# Patient Record
Sex: MALE | Race: WHITE
[De-identification: names, ages, dates, MRNs, and addresses within clinical notes are randomized per-mention and may not be internally consistent; named-entity substitution may affect disease eponyms.]

---

## 2018-09-18 ENCOUNTER — HOSPITAL ENCOUNTER (OUTPATIENT)
Dept: HOSPITAL 9 - MADLAB | Age: 73
Discharge: HOME | End: 2018-09-18
Payer: MEDICARE

## 2018-09-18 DIAGNOSIS — I48.0: Primary | ICD-10-CM

## 2018-09-18 LAB
BASOPHILS # BLD AUTO: 0.1 THOU/UL (ref 0–0.2)
BASOPHILS NFR BLD AUTO: 1 % (ref 0–1)
EOSINOPHIL # BLD AUTO: 0.4 THOU/UL (ref 0–0.7)
EOSINOPHIL NFR BLD AUTO: 4 % (ref 0–10)
HGB BLD-MCNC: 13.8 G/DL (ref 14–18)
LYMPHOCYTES # BLD AUTO: 2.5 THOU/UL (ref 1.2–3.4)
LYMPHOCYTES NFR BLD AUTO: 27.5 % (ref 21–51)
MCH RBC QN AUTO: 33.6 PG (ref 27–31)
MCV RBC AUTO: 98 FL (ref 78–98)
MONOCYTES # BLD AUTO: 0.8 THOU/UL (ref 0.11–0.59)
MONOCYTES NFR BLD AUTO: 8.9 % (ref 0–10)
NEUTROPHILS # BLD AUTO: 5.3 THOU/UL (ref 1.4–6.5)
NEUTROPHILS NFR BLD AUTO: 58.6 % (ref 42–75)
PLATELET # BLD AUTO: 148 THOU/UL (ref 130–400)
RBC # BLD AUTO: 4.1 MILL/UL (ref 4.7–6.1)
WBC # BLD AUTO: 9 THOU/UL (ref 4.8–10.8)

## 2018-09-18 PROCEDURE — 85025 COMPLETE CBC W/AUTO DIFF WBC: CPT

## 2018-09-18 PROCEDURE — 36415 COLL VENOUS BLD VENIPUNCTURE: CPT

## 2020-12-08 ENCOUNTER — HOSPITAL ENCOUNTER (OUTPATIENT)
Dept: HOSPITAL 92 - LABBT | Age: 75
Discharge: HOME | End: 2020-12-08
Attending: ORTHOPAEDIC SURGERY
Payer: MEDICARE

## 2020-12-08 DIAGNOSIS — Z20.828: ICD-10-CM

## 2020-12-08 DIAGNOSIS — S83.241A: ICD-10-CM

## 2020-12-08 DIAGNOSIS — Z01.818: Primary | ICD-10-CM

## 2020-12-08 LAB
ANION GAP SERPL CALC-SCNC: 15 MMOL/L (ref 10–20)
BASOPHILS # BLD AUTO: 0.1 10X3/UL (ref 0–0.2)
BASOPHILS NFR BLD AUTO: 0.6 % (ref 0–2)
BUN SERPL-MCNC: 14 MG/DL (ref 8.4–25.7)
CALCIUM SERPL-MCNC: 8.7 MG/DL (ref 7.8–10.44)
CHLORIDE SERPL-SCNC: 106 MMOL/L (ref 98–107)
CO2 SERPL-SCNC: 27 MMOL/L (ref 23–31)
CREAT CL PREDICTED SERPL C-G-VRATE: 0 ML/MIN (ref 70–130)
EOSINOPHIL # BLD AUTO: 0.5 10X3/UL (ref 0–0.5)
EOSINOPHIL NFR BLD AUTO: 6 % (ref 0–6)
GLUCOSE SERPL-MCNC: 113 MG/DL (ref 83–110)
HGB BLD-MCNC: 11.4 G/DL (ref 14–18)
LYMPHOCYTES NFR BLD AUTO: 25.2 % (ref 18–47)
MCH RBC QN AUTO: 29 PG (ref 27–33)
MCV RBC AUTO: 94.4 FL (ref 80–100)
MONOCYTES # BLD AUTO: 0.7 10X3/UL (ref 0–1.1)
MONOCYTES NFR BLD AUTO: 8.8 % (ref 0–10)
NEUTROPHILS # BLD AUTO: 4.8 10X3/UL (ref 1.5–8.4)
NEUTROPHILS NFR BLD AUTO: 58.9 % (ref 40–75)
PLATELET # BLD AUTO: 158 10X3/UL (ref 130–400)
POTASSIUM SERPL-SCNC: 4.7 MMOL/L (ref 3.5–5.1)
RBC # BLD AUTO: 3.93 10X6/UL (ref 4.4–5.8)
SODIUM SERPL-SCNC: 143 MMOL/L (ref 136–145)
WBC # BLD AUTO: 8.1 10X3/UL (ref 4.5–11)

## 2020-12-08 PROCEDURE — U0003 INFECTIOUS AGENT DETECTION BY NUCLEIC ACID (DNA OR RNA); SEVERE ACUTE RESPIRATORY SYNDROME CORONAVIRUS 2 (SARS-COV-2) (CORONAVIRUS DISEASE [COVID-19]), AMPLIFIED PROBE TECHNIQUE, MAKING USE OF HIGH THROUGHPUT TECHNOLOGIES AS DESCRIBED BY CMS-2020-01-R: HCPCS

## 2020-12-08 PROCEDURE — 85025 COMPLETE CBC W/AUTO DIFF WBC: CPT

## 2020-12-08 PROCEDURE — 87635 SARS-COV-2 COVID-19 AMP PRB: CPT

## 2020-12-08 PROCEDURE — 93005 ELECTROCARDIOGRAM TRACING: CPT

## 2020-12-08 PROCEDURE — 93010 ELECTROCARDIOGRAM REPORT: CPT

## 2020-12-08 PROCEDURE — 80048 BASIC METABOLIC PNL TOTAL CA: CPT

## 2020-12-11 ENCOUNTER — HOSPITAL ENCOUNTER (OUTPATIENT)
Dept: HOSPITAL 92 - SDC | Age: 75
Discharge: HOME | End: 2020-12-11
Attending: ORTHOPAEDIC SURGERY
Payer: MEDICARE

## 2020-12-11 VITALS — BODY MASS INDEX: 32.1 KG/M2

## 2020-12-11 DIAGNOSIS — S83.242A: Primary | ICD-10-CM

## 2020-12-11 DIAGNOSIS — I10: ICD-10-CM

## 2020-12-11 DIAGNOSIS — E78.5: ICD-10-CM

## 2020-12-11 DIAGNOSIS — I25.10: ICD-10-CM

## 2020-12-11 DIAGNOSIS — Z87.891: ICD-10-CM

## 2020-12-11 DIAGNOSIS — Z79.899: ICD-10-CM

## 2020-12-11 DIAGNOSIS — Z79.82: ICD-10-CM

## 2020-12-11 DIAGNOSIS — Z95.5: ICD-10-CM

## 2020-12-11 DIAGNOSIS — Z95.1: ICD-10-CM

## 2020-12-11 DIAGNOSIS — M94.262: ICD-10-CM

## 2020-12-11 PROCEDURE — 0SBD4ZZ EXCISION OF LEFT KNEE JOINT, PERCUTANEOUS ENDOSCOPIC APPROACH: ICD-10-PCS | Performed by: ORTHOPAEDIC SURGERY

## 2020-12-11 PROCEDURE — S0020 INJECTION, BUPIVICAINE HYDRO: HCPCS

## 2020-12-11 NOTE — OP
DATE OF PROCEDURE:  12/11/2020



PROCEDURE PERFORMED:  Left knee arthroscopic partial medial meniscectomy.



PREOPERATIVE DIAGNOSIS:  Medial meniscus tear, left knee.



POSTOPERATIVE DIAGNOSES:  Medial meniscus tear of left knee with grade 3

chondromalacia of medial femoral condyle, grade 4 chondromalacia of small area

medial tibial plateau, intact lateral compartment.  No loose bodies.  Mild

patellofemoral arthritis. 



ANESTHESIA:  General.



BLOOD LOSS:  Minimal.



SPECIMEN:  None.



DRAINS:  None.



COMPLICATION:  None.



DESCRIPTION OF PROCEDURE:  Informed consent was obtained.  The patient was taken to

the operating room where general anesthesia was induced. Left leg was prepped and

draped in the usual sterile fashion. Scope was placed in the lateral portal. Probe

was placed in the medial portal.  Findings were as above.  I debrided the medial

meniscus with basket forceps, smoothed with a 4.0 full-radius resector. I did this

alternating until most of the posterior medial meniscus was removed.  I did perform

a small abrasion chondroplasty on the medial femoral and the tibial plateau

surfaces.  I swept the gutters for loose bodies, there were no loose bodies.  The

knee was irrigated and drained.  Sterile dressings applied. 







Job ID:  [id number]

## 2021-03-26 ENCOUNTER — HOSPITAL ENCOUNTER (INPATIENT)
Dept: HOSPITAL 92 - ERS | Age: 76
LOS: 3 days | Discharge: HOME | DRG: 811 | End: 2021-03-29
Attending: INTERNAL MEDICINE | Admitting: HOSPITALIST
Payer: MEDICARE

## 2021-03-26 VITALS — BODY MASS INDEX: 31.7 KG/M2

## 2021-03-26 DIAGNOSIS — Z79.01: ICD-10-CM

## 2021-03-26 DIAGNOSIS — I25.10: ICD-10-CM

## 2021-03-26 DIAGNOSIS — Z95.810: ICD-10-CM

## 2021-03-26 DIAGNOSIS — K64.8: ICD-10-CM

## 2021-03-26 DIAGNOSIS — I25.5: ICD-10-CM

## 2021-03-26 DIAGNOSIS — K25.4: ICD-10-CM

## 2021-03-26 DIAGNOSIS — K29.80: ICD-10-CM

## 2021-03-26 DIAGNOSIS — Z95.1: ICD-10-CM

## 2021-03-26 DIAGNOSIS — I11.0: ICD-10-CM

## 2021-03-26 DIAGNOSIS — D50.0: Primary | ICD-10-CM

## 2021-03-26 DIAGNOSIS — K63.5: ICD-10-CM

## 2021-03-26 DIAGNOSIS — I50.9: ICD-10-CM

## 2021-03-26 DIAGNOSIS — Z20.822: ICD-10-CM

## 2021-03-26 DIAGNOSIS — I48.0: ICD-10-CM

## 2021-03-26 DIAGNOSIS — Z87.891: ICD-10-CM

## 2021-03-26 DIAGNOSIS — K57.30: ICD-10-CM

## 2021-03-26 DIAGNOSIS — K29.71: ICD-10-CM

## 2021-03-26 DIAGNOSIS — E78.5: ICD-10-CM

## 2021-03-26 LAB
ALBUMIN SERPL BCG-MCNC: 3.7 G/DL (ref 3.4–4.8)
ALP SERPL-CCNC: 97 U/L (ref 40–110)
ALT SERPL W P-5'-P-CCNC: 8 U/L (ref 8–55)
ANION GAP SERPL CALC-SCNC: 12 MMOL/L (ref 10–20)
APTT PPP: 38.7 SEC (ref 22.9–36.1)
AST SERPL-CCNC: 19 U/L (ref 5–34)
BASOPHILS # BLD AUTO: 0 THOU/UL (ref 0–0.2)
BASOPHILS NFR BLD AUTO: 0.1 % (ref 0–1)
BILIRUB SERPL-MCNC: 0.7 MG/DL (ref 0.2–1.2)
BUN SERPL-MCNC: 16 MG/DL (ref 8.4–25.7)
CALCIUM SERPL-MCNC: 8.2 MG/DL (ref 7.8–10.44)
CHLORIDE SERPL-SCNC: 110 MMOL/L (ref 98–107)
CO2 SERPL-SCNC: 22 MMOL/L (ref 23–31)
CREAT CL PREDICTED SERPL C-G-VRATE: 0 ML/MIN (ref 70–130)
EOSINOPHIL # BLD AUTO: 0.2 THOU/UL (ref 0–0.7)
EOSINOPHIL NFR BLD AUTO: 3.3 % (ref 0–10)
GLOBULIN SER CALC-MCNC: 2.6 G/DL (ref 2.4–3.5)
GLUCOSE SERPL-MCNC: 117 MG/DL (ref 83–110)
HGB BLD-MCNC: 6 G/DL (ref 14–18)
HGB BLD-MCNC: 6.1 G/DL (ref 14–18)
HGB BLD-MCNC: 6.8 G/DL (ref 14–18)
INR PPP: 2.1
IRON SERPL-MCNC: 28 UG/DL (ref 65–175)
LYMPHOCYTES # BLD: 1.5 THOU/UL (ref 1.2–3.4)
LYMPHOCYTES NFR BLD AUTO: 21.2 % (ref 21–51)
MCH RBC QN AUTO: 25.3 PG (ref 27–31)
MCV RBC AUTO: 84 FL (ref 78–98)
MONOCYTES # BLD AUTO: 0.9 THOU/UL (ref 0.11–0.59)
MONOCYTES NFR BLD AUTO: 12.5 % (ref 0–10)
NEUTROPHILS # BLD AUTO: 4.4 THOU/UL (ref 1.4–6.5)
NEUTROPHILS NFR BLD AUTO: 62.9 % (ref 42–75)
PLATELET # BLD AUTO: 156 THOU/UL (ref 130–400)
POTASSIUM SERPL-SCNC: 4.4 MMOL/L (ref 3.5–5.1)
PROTHROMBIN TIME: 24 SEC (ref 12–14.7)
RBC # BLD AUTO: 2.39 MILL/UL (ref 4.7–6.1)
SODIUM SERPL-SCNC: 140 MMOL/L (ref 136–145)
UIBC SERPL-MCNC: 430 MCG/DL (ref 261–462)
WBC # BLD AUTO: 7 THOU/UL (ref 4.8–10.8)

## 2021-03-26 PROCEDURE — 86900 BLOOD TYPING SEROLOGIC ABO: CPT

## 2021-03-26 PROCEDURE — 82728 ASSAY OF FERRITIN: CPT

## 2021-03-26 PROCEDURE — 80048 BASIC METABOLIC PNL TOTAL CA: CPT

## 2021-03-26 PROCEDURE — 36416 COLLJ CAPILLARY BLOOD SPEC: CPT

## 2021-03-26 PROCEDURE — 36430 TRANSFUSION BLD/BLD COMPNT: CPT

## 2021-03-26 PROCEDURE — 90732 PPSV23 VACC 2 YRS+ SUBQ/IM: CPT

## 2021-03-26 PROCEDURE — 85730 THROMBOPLASTIN TIME PARTIAL: CPT

## 2021-03-26 PROCEDURE — 83540 ASSAY OF IRON: CPT

## 2021-03-26 PROCEDURE — 90471 IMMUNIZATION ADMIN: CPT

## 2021-03-26 PROCEDURE — 36415 COLL VENOUS BLD VENIPUNCTURE: CPT

## 2021-03-26 PROCEDURE — 80053 COMPREHEN METABOLIC PANEL: CPT

## 2021-03-26 PROCEDURE — 30233N1 TRANSFUSION OF NONAUTOLOGOUS RED BLOOD CELLS INTO PERIPHERAL VEIN, PERCUTANEOUS APPROACH: ICD-10-PCS | Performed by: INTERNAL MEDICINE

## 2021-03-26 PROCEDURE — 82746 ASSAY OF FOLIC ACID SERUM: CPT

## 2021-03-26 PROCEDURE — 84484 ASSAY OF TROPONIN QUANT: CPT

## 2021-03-26 PROCEDURE — 83880 ASSAY OF NATRIURETIC PEPTIDE: CPT

## 2021-03-26 PROCEDURE — P9016 RBC LEUKOCYTES REDUCED: HCPCS

## 2021-03-26 PROCEDURE — 83550 IRON BINDING TEST: CPT

## 2021-03-26 PROCEDURE — 86850 RBC ANTIBODY SCREEN: CPT

## 2021-03-26 PROCEDURE — C9113 INJ PANTOPRAZOLE SODIUM, VIA: HCPCS

## 2021-03-26 PROCEDURE — 87635 SARS-COV-2 COVID-19 AMP PRB: CPT

## 2021-03-26 PROCEDURE — U0005 INFEC AGEN DETEC AMPLI PROBE: HCPCS

## 2021-03-26 PROCEDURE — U0003 INFECTIOUS AGENT DETECTION BY NUCLEIC ACID (DNA OR RNA); SEVERE ACUTE RESPIRATORY SYNDROME CORONAVIRUS 2 (SARS-COV-2) (CORONAVIRUS DISEASE [COVID-19]), AMPLIFIED PROBE TECHNIQUE, MAKING USE OF HIGH THROUGHPUT TECHNOLOGIES AS DESCRIBED BY CMS-2020-01-R: HCPCS

## 2021-03-26 PROCEDURE — 85610 PROTHROMBIN TIME: CPT

## 2021-03-26 PROCEDURE — 82607 VITAMIN B-12: CPT

## 2021-03-26 PROCEDURE — 82274 ASSAY TEST FOR BLOOD FECAL: CPT

## 2021-03-26 PROCEDURE — G0009 ADMIN PNEUMOCOCCAL VACCINE: HCPCS

## 2021-03-26 PROCEDURE — 88305 TISSUE EXAM BY PATHOLOGIST: CPT

## 2021-03-26 PROCEDURE — 85025 COMPLETE CBC W/AUTO DIFF WBC: CPT

## 2021-03-26 PROCEDURE — 86901 BLOOD TYPING SEROLOGIC RH(D): CPT

## 2021-03-27 LAB
ANION GAP SERPL CALC-SCNC: 13 MMOL/L (ref 10–20)
BASOPHILS # BLD AUTO: 0 THOU/UL (ref 0–0.2)
BASOPHILS NFR BLD AUTO: 0.3 % (ref 0–1)
BUN SERPL-MCNC: 14 MG/DL (ref 8.4–25.7)
CALCIUM SERPL-MCNC: 7.8 MG/DL (ref 7.8–10.44)
CHLORIDE SERPL-SCNC: 109 MMOL/L (ref 98–107)
CO2 SERPL-SCNC: 21 MMOL/L (ref 23–31)
CREAT CL PREDICTED SERPL C-G-VRATE: 106 ML/MIN (ref 70–130)
EOSINOPHIL # BLD AUTO: 0.3 THOU/UL (ref 0–0.7)
EOSINOPHIL NFR BLD AUTO: 4.2 % (ref 0–10)
GLUCOSE SERPL-MCNC: 96 MG/DL (ref 83–110)
HGB BLD-MCNC: 6.5 G/DL (ref 14–18)
HGB BLD-MCNC: 6.6 G/DL (ref 14–18)
HGB BLD-MCNC: 6.8 G/DL (ref 14–18)
HGB BLD-MCNC: 8.3 G/DL (ref 14–18)
LYMPHOCYTES # BLD: 1.7 THOU/UL (ref 1.2–3.4)
LYMPHOCYTES NFR BLD AUTO: 20.4 % (ref 21–51)
MCH RBC QN AUTO: 26 PG (ref 27–31)
MCV RBC AUTO: 84 FL (ref 78–98)
MONOCYTES # BLD AUTO: 0.9 THOU/UL (ref 0.11–0.59)
MONOCYTES NFR BLD AUTO: 11.1 % (ref 0–10)
NEUTROPHILS # BLD AUTO: 5.2 THOU/UL (ref 1.4–6.5)
NEUTROPHILS NFR BLD AUTO: 63.9 % (ref 42–75)
PLATELET # BLD AUTO: 144 THOU/UL (ref 130–400)
POTASSIUM SERPL-SCNC: 4 MMOL/L (ref 3.5–5.1)
RBC # BLD AUTO: 2.6 MILL/UL (ref 4.7–6.1)
SODIUM SERPL-SCNC: 139 MMOL/L (ref 136–145)
WBC # BLD AUTO: 8.2 THOU/UL (ref 4.8–10.8)

## 2021-03-28 LAB
ANION GAP SERPL CALC-SCNC: 17 MMOL/L (ref 10–20)
BASOPHILS # BLD AUTO: 0 THOU/UL (ref 0–0.2)
BASOPHILS NFR BLD AUTO: 0.2 % (ref 0–1)
BUN SERPL-MCNC: 13 MG/DL (ref 8.4–25.7)
CALCIUM SERPL-MCNC: 8.1 MG/DL (ref 7.8–10.44)
CHLORIDE SERPL-SCNC: 109 MMOL/L (ref 98–107)
CO2 SERPL-SCNC: 18 MMOL/L (ref 23–31)
CREAT CL PREDICTED SERPL C-G-VRATE: 108 ML/MIN (ref 70–130)
EOSINOPHIL # BLD AUTO: 0.4 THOU/UL (ref 0–0.7)
EOSINOPHIL NFR BLD AUTO: 5.4 % (ref 0–10)
GLUCOSE SERPL-MCNC: 98 MG/DL (ref 83–110)
HGB BLD-MCNC: 7.9 G/DL (ref 14–18)
LYMPHOCYTES # BLD: 1.2 THOU/UL (ref 1.2–3.4)
LYMPHOCYTES NFR BLD AUTO: 14.6 % (ref 21–51)
MCH RBC QN AUTO: 26.8 PG (ref 27–31)
MCV RBC AUTO: 84 FL (ref 78–98)
MDIFF COMPLETE?: YES
MONOCYTES # BLD AUTO: 0.9 THOU/UL (ref 0.11–0.59)
MONOCYTES NFR BLD AUTO: 10.6 % (ref 0–10)
NEUTROPHILS # BLD AUTO: 5.7 THOU/UL (ref 1.4–6.5)
NEUTROPHILS NFR BLD AUTO: 69.2 % (ref 42–75)
PLATELET # BLD AUTO: 115 THOU/UL (ref 130–400)
POLYCHROMASIA BLD QL SMEAR: (no result) (100X)
POTASSIUM SERPL-SCNC: 3.9 MMOL/L (ref 3.5–5.1)
RBC # BLD AUTO: 2.96 MILL/UL (ref 4.7–6.1)
SODIUM SERPL-SCNC: 140 MMOL/L (ref 136–145)
WBC # BLD AUTO: 8.2 THOU/UL (ref 4.8–10.8)

## 2021-03-28 PROCEDURE — 0DB68ZX EXCISION OF STOMACH, VIA NATURAL OR ARTIFICIAL OPENING ENDOSCOPIC, DIAGNOSTIC: ICD-10-PCS | Performed by: INTERNAL MEDICINE

## 2021-03-28 PROCEDURE — 0DBN8ZZ EXCISION OF SIGMOID COLON, VIA NATURAL OR ARTIFICIAL OPENING ENDOSCOPIC: ICD-10-PCS | Performed by: INTERNAL MEDICINE

## 2021-03-29 VITALS — TEMPERATURE: 98.9 F | DIASTOLIC BLOOD PRESSURE: 72 MMHG | SYSTOLIC BLOOD PRESSURE: 131 MMHG

## 2021-03-29 LAB
ANION GAP SERPL CALC-SCNC: 12 MMOL/L (ref 10–20)
BASOPHILS # BLD AUTO: 0 THOU/UL (ref 0–0.2)
BASOPHILS NFR BLD AUTO: 0.2 % (ref 0–1)
BUN SERPL-MCNC: 15 MG/DL (ref 8.4–25.7)
CALCIUM SERPL-MCNC: 8.2 MG/DL (ref 7.8–10.44)
CHLORIDE SERPL-SCNC: 107 MMOL/L (ref 98–107)
CO2 SERPL-SCNC: 26 MMOL/L (ref 23–31)
CREAT CL PREDICTED SERPL C-G-VRATE: 97 ML/MIN (ref 70–130)
EOSINOPHIL # BLD AUTO: 0.5 THOU/UL (ref 0–0.7)
EOSINOPHIL NFR BLD AUTO: 6.4 % (ref 0–10)
GLUCOSE SERPL-MCNC: 102 MG/DL (ref 83–110)
HGB BLD-MCNC: 7.9 G/DL (ref 14–18)
LYMPHOCYTES # BLD: 1.4 THOU/UL (ref 1.2–3.4)
LYMPHOCYTES NFR BLD AUTO: 19.8 % (ref 21–51)
MCH RBC QN AUTO: 26.4 PG (ref 27–31)
MCV RBC AUTO: 84.5 FL (ref 78–98)
MONOCYTES # BLD AUTO: 0.9 THOU/UL (ref 0.11–0.59)
MONOCYTES NFR BLD AUTO: 12 % (ref 0–10)
NEUTROPHILS # BLD AUTO: 4.4 THOU/UL (ref 1.4–6.5)
NEUTROPHILS NFR BLD AUTO: 61.6 % (ref 42–75)
PLATELET # BLD AUTO: 157 THOU/UL (ref 130–400)
POTASSIUM SERPL-SCNC: 3.6 MMOL/L (ref 3.5–5.1)
RBC # BLD AUTO: 2.99 MILL/UL (ref 4.7–6.1)
SODIUM SERPL-SCNC: 141 MMOL/L (ref 136–145)
WBC # BLD AUTO: 7.2 THOU/UL (ref 4.8–10.8)

## 2023-03-07 ENCOUNTER — HOSPITAL ENCOUNTER (INPATIENT)
Dept: HOSPITAL 92 - 2NO | Age: 78
LOS: 2 days | Discharge: HOME | DRG: 310 | End: 2023-03-09
Attending: INTERNAL MEDICINE | Admitting: INTERNAL MEDICINE
Payer: MEDICARE

## 2023-03-07 VITALS — BODY MASS INDEX: 31.1 KG/M2

## 2023-03-07 DIAGNOSIS — I48.19: Primary | ICD-10-CM

## 2023-03-07 DIAGNOSIS — I50.9: ICD-10-CM

## 2023-03-07 DIAGNOSIS — Z20.822: ICD-10-CM

## 2023-03-07 DIAGNOSIS — Z95.1: ICD-10-CM

## 2023-03-07 DIAGNOSIS — Z79.82: ICD-10-CM

## 2023-03-07 DIAGNOSIS — I11.0: ICD-10-CM

## 2023-03-07 DIAGNOSIS — Z95.810: ICD-10-CM

## 2023-03-07 DIAGNOSIS — K21.9: ICD-10-CM

## 2023-03-07 DIAGNOSIS — E78.00: ICD-10-CM

## 2023-03-07 DIAGNOSIS — Z79.899: ICD-10-CM

## 2023-03-07 DIAGNOSIS — I25.5: ICD-10-CM

## 2023-03-07 DIAGNOSIS — E78.5: ICD-10-CM

## 2023-03-07 LAB
ANION GAP SERPL CALC-SCNC: 11 MMOL/L (ref 10–20)
BUN SERPL-MCNC: 18 MG/DL (ref 8.4–25.7)
CALCIUM SERPL-MCNC: 9.5 MG/DL (ref 7.8–10.44)
CHLORIDE SERPL-SCNC: 107 MMOL/L (ref 98–107)
CO2 SERPL-SCNC: 25 MMOL/L (ref 23–31)
CREAT CL PREDICTED SERPL C-G-VRATE: 95 ML/MIN (ref 70–130)
GLUCOSE SERPL-MCNC: 128 MG/DL (ref 83–110)
MAGNESIUM SERPL-MCNC: 1.7 MG/DL (ref 1.6–2.6)
POTASSIUM SERPL-SCNC: 3.9 MMOL/L (ref 3.5–5.1)
SODIUM SERPL-SCNC: 139 MMOL/L (ref 136–145)

## 2023-03-07 PROCEDURE — 93005 ELECTROCARDIOGRAM TRACING: CPT

## 2023-03-07 PROCEDURE — U0005 INFEC AGEN DETEC AMPLI PROBE: HCPCS

## 2023-03-07 PROCEDURE — 80048 BASIC METABOLIC PNL TOTAL CA: CPT

## 2023-03-07 PROCEDURE — 85025 COMPLETE CBC W/AUTO DIFF WBC: CPT

## 2023-03-07 PROCEDURE — U0003 INFECTIOUS AGENT DETECTION BY NUCLEIC ACID (DNA OR RNA); SEVERE ACUTE RESPIRATORY SYNDROME CORONAVIRUS 2 (SARS-COV-2) (CORONAVIRUS DISEASE [COVID-19]), AMPLIFIED PROBE TECHNIQUE, MAKING USE OF HIGH THROUGHPUT TECHNOLOGIES AS DESCRIBED BY CMS-2020-01-R: HCPCS

## 2023-03-07 PROCEDURE — 83735 ASSAY OF MAGNESIUM: CPT

## 2023-03-07 PROCEDURE — 36415 COLL VENOUS BLD VENIPUNCTURE: CPT

## 2023-03-07 PROCEDURE — 93010 ELECTROCARDIOGRAM REPORT: CPT

## 2023-03-08 LAB
ANION GAP SERPL CALC-SCNC: 17 MMOL/L (ref 10–20)
BASOPHILS # BLD AUTO: 0 THOU/UL (ref 0–0.2)
BASOPHILS NFR BLD AUTO: 0 % (ref 0–1)
BUN SERPL-MCNC: 14 MG/DL (ref 8.4–25.7)
CALCIUM SERPL-MCNC: 9.1 MG/DL (ref 7.8–10.44)
CHLORIDE SERPL-SCNC: 108 MMOL/L (ref 98–107)
CO2 SERPL-SCNC: 19 MMOL/L (ref 23–31)
CREAT CL PREDICTED SERPL C-G-VRATE: 95 ML/MIN (ref 70–130)
EOSINOPHIL # BLD AUTO: 0.2 THOU/UL (ref 0–0.7)
EOSINOPHIL NFR BLD AUTO: 2.9 % (ref 0–10)
GLUCOSE SERPL-MCNC: 93 MG/DL (ref 83–110)
HGB BLD-MCNC: 13 G/DL (ref 14–18)
LYMPHOCYTES # BLD: 2.3 THOU/UL (ref 1.2–3.4)
LYMPHOCYTES NFR BLD AUTO: 27.3 % (ref 21–51)
MACROCYTES BLD QL SMEAR: (no result) (100X)
MAGNESIUM SERPL-MCNC: 1.7 MG/DL (ref 1.6–2.6)
MCH RBC QN AUTO: 33.7 PG (ref 27–31)
MCV RBC AUTO: 103 FL (ref 78–98)
MDIFF COMPLETE?: YES
MONOCYTES # BLD AUTO: 0.7 THOU/UL (ref 0.11–0.59)
MONOCYTES NFR BLD AUTO: 8.4 % (ref 0–10)
NEUTROPHILS # BLD AUTO: 5.2 THOU/UL (ref 1.4–6.5)
NEUTROPHILS NFR BLD AUTO: 61.3 % (ref 42–75)
PLATELET # BLD AUTO: 111 10X3/UL (ref 130–400)
POTASSIUM SERPL-SCNC: 4 MMOL/L (ref 3.5–5.1)
RBC # BLD AUTO: 3.85 MILL/UL (ref 4.7–6.1)
SODIUM SERPL-SCNC: 140 MMOL/L (ref 136–145)
WBC # BLD AUTO: 8.5 10X3/UL (ref 4.8–10.8)

## 2023-03-08 RX ADMIN — ASPIRIN SCH MG: 81 TABLET ORAL at 09:50

## 2023-03-09 VITALS — TEMPERATURE: 98.1 F | DIASTOLIC BLOOD PRESSURE: 68 MMHG | SYSTOLIC BLOOD PRESSURE: 126 MMHG

## 2023-03-09 LAB
ANION GAP SERPL CALC-SCNC: 14 MMOL/L (ref 10–20)
BUN SERPL-MCNC: 17 MG/DL (ref 8.4–25.7)
CALCIUM SERPL-MCNC: 8.9 MG/DL (ref 7.8–10.44)
CHLORIDE SERPL-SCNC: 107 MMOL/L (ref 98–107)
CO2 SERPL-SCNC: 23 MMOL/L (ref 23–31)
CREAT CL PREDICTED SERPL C-G-VRATE: 84 ML/MIN (ref 70–130)
GLUCOSE SERPL-MCNC: 94 MG/DL (ref 83–110)
MAGNESIUM SERPL-MCNC: 1.8 MG/DL (ref 1.6–2.6)
POTASSIUM SERPL-SCNC: 4.1 MMOL/L (ref 3.5–5.1)
SODIUM SERPL-SCNC: 140 MMOL/L (ref 136–145)

## 2023-03-09 RX ADMIN — ASPIRIN SCH MG: 81 TABLET ORAL at 10:05

## 2023-06-15 LAB
ANION GAP SERPL CALC-SCNC: 14 MMOL/L (ref 10–20)
APTT PPP: 39.4 SEC (ref 22–33)
BUN SERPL-MCNC: 13 MG/DL (ref 8.4–25.7)
CALCIUM SERPL-MCNC: 8.9 MG/DL (ref 7.8–10.44)
CHLORIDE SERPL-SCNC: 107 MMOL/L (ref 98–107)
CO2 SERPL-SCNC: 25 MMOL/L (ref 23–31)
CREAT CL PREDICTED SERPL C-G-VRATE: 103 ML/MIN (ref 70–130)
GLUCOSE SERPL-MCNC: 95 MG/DL (ref 83–110)
HGB BLD-MCNC: 14.8 G/DL (ref 13.5–17.5)
INR PPP: 1.4
MCH RBC QN AUTO: 32.4 PG (ref 27–33)
MCV RBC AUTO: 97.4 FL (ref 81.2–95.1)
PLATELET # BLD AUTO: 138 10X3/UL (ref 150–450)
POTASSIUM SERPL-SCNC: 4.3 MMOL/L (ref 3.5–5.1)
PROTHROMBIN TIME: 14.7 SEC (ref 9.5–12.1)
RBC # BLD AUTO: 4.57 10X6/UL (ref 4.32–5.72)
SODIUM SERPL-SCNC: 142 MMOL/L (ref 136–145)
WBC # BLD AUTO: 9.8 10X3/UL (ref 3.5–10.5)

## 2023-06-19 ENCOUNTER — HOSPITAL ENCOUNTER (OUTPATIENT)
Dept: HOSPITAL 92 - SDC | Age: 78
Discharge: HOME | End: 2023-06-19
Attending: INTERNAL MEDICINE
Payer: MEDICARE

## 2023-06-19 VITALS — BODY MASS INDEX: 32.5 KG/M2

## 2023-06-19 DIAGNOSIS — Z79.899: ICD-10-CM

## 2023-06-19 DIAGNOSIS — I50.9: ICD-10-CM

## 2023-06-19 DIAGNOSIS — I11.0: ICD-10-CM

## 2023-06-19 DIAGNOSIS — I25.810: ICD-10-CM

## 2023-06-19 DIAGNOSIS — Z87.891: ICD-10-CM

## 2023-06-19 DIAGNOSIS — Z79.01: ICD-10-CM

## 2023-06-19 DIAGNOSIS — I48.19: ICD-10-CM

## 2023-06-19 DIAGNOSIS — K92.2: ICD-10-CM

## 2023-06-19 DIAGNOSIS — I48.91: Primary | ICD-10-CM

## 2023-06-19 DIAGNOSIS — Z79.82: ICD-10-CM

## 2023-06-19 DIAGNOSIS — E78.00: ICD-10-CM

## 2023-06-19 DIAGNOSIS — I48.0: ICD-10-CM

## 2023-06-19 DIAGNOSIS — Z95.818: ICD-10-CM

## 2023-06-19 DIAGNOSIS — Z88.8: ICD-10-CM

## 2023-06-19 DIAGNOSIS — I25.2: ICD-10-CM

## 2023-06-19 DIAGNOSIS — I42.5: ICD-10-CM

## 2023-06-19 DIAGNOSIS — Z95.810: ICD-10-CM

## 2023-06-19 PROCEDURE — C1732 CATH, EP, DIAG/ABL, 3D/VECT: HCPCS

## 2023-06-19 PROCEDURE — 85347 COAGULATION TIME ACTIVATED: CPT

## 2023-06-19 PROCEDURE — C1894 INTRO/SHEATH, NON-LASER: HCPCS

## 2023-06-19 PROCEDURE — 93005 ELECTROCARDIOGRAM TRACING: CPT

## 2023-06-19 PROCEDURE — C1760 CLOSURE DEV, VASC: HCPCS

## 2023-06-19 PROCEDURE — 93623 PRGRMD STIMJ&PACG IV RX NFS: CPT

## 2023-06-19 PROCEDURE — C1759 CATH, INTRA ECHOCARDIOGRAPHY: HCPCS

## 2023-06-19 PROCEDURE — 93656 COMPRE EP EVAL ABLTJ ATR FIB: CPT

## 2023-06-19 PROCEDURE — 85730 THROMBOPLASTIN TIME PARTIAL: CPT

## 2023-06-19 PROCEDURE — 93657 TX L/R ATRIAL FIB ADDL: CPT

## 2023-06-19 PROCEDURE — 85610 PROTHROMBIN TIME: CPT

## 2023-06-19 PROCEDURE — 80048 BASIC METABOLIC PNL TOTAL CA: CPT

## 2023-06-19 PROCEDURE — 85027 COMPLETE CBC AUTOMATED: CPT
